# Patient Record
Sex: FEMALE | Race: WHITE | URBAN - METROPOLITAN AREA
[De-identification: names, ages, dates, MRNs, and addresses within clinical notes are randomized per-mention and may not be internally consistent; named-entity substitution may affect disease eponyms.]

---

## 2017-03-28 ENCOUNTER — OFFICE VISIT (OUTPATIENT)
Dept: FAMILY MEDICINE | Facility: CLINIC | Age: 27
End: 2017-03-28
Payer: COMMERCIAL

## 2017-03-28 VITALS
HEIGHT: 57 IN | BODY MASS INDEX: 22.44 KG/M2 | HEART RATE: 67 BPM | OXYGEN SATURATION: 100 % | SYSTOLIC BLOOD PRESSURE: 121 MMHG | TEMPERATURE: 97 F | WEIGHT: 104 LBS | DIASTOLIC BLOOD PRESSURE: 67 MMHG

## 2017-03-28 DIAGNOSIS — Z30.40 ENCOUNTER FOR SURVEILLANCE OF CONTRACEPTIVES: ICD-10-CM

## 2017-03-28 DIAGNOSIS — Z00.00 ROUTINE GENERAL MEDICAL EXAMINATION AT A HEALTH CARE FACILITY: Primary | ICD-10-CM

## 2017-03-28 PROCEDURE — 99395 PREV VISIT EST AGE 18-39: CPT | Performed by: PHYSICIAN ASSISTANT

## 2017-03-28 RX ORDER — ETONOGESTREL AND ETHINYL ESTRADIOL VAGINAL RING .015; .12 MG/D; MG/D
1 RING VAGINAL
Qty: 3 EACH | Refills: 3 | Status: SHIPPED | OUTPATIENT
Start: 2017-03-28 | End: 2017-05-18

## 2017-03-28 RX ORDER — CETIRIZINE HYDROCHLORIDE 10 MG/1
10 TABLET ORAL DAILY
COMMUNITY

## 2017-03-28 NOTE — MR AVS SNAPSHOT
After Visit Summary   3/28/2017    Aly Burrell    MRN: [id number]           Patient Information     Date Of Birth          1990        Visit Information        Provider Department      3/28/2017 4:40 PM Joceline Gaming PA-C [facility]        Today's Diagnoses     Routine general medical examination at a health care facility    -  1    Encounter for surveillance of contraceptives          Care Instructions      Preventive Health Recommendations  Female Ages 26 - 39  Yearly exam:   See your health care provider every year in order to    Review health changes.     Discuss preventive care.      Review your medicines if you your doctor has prescribed any.    Until age 30: Get a Pap test every three years (more often if you have had an abnormal result).    After age 30: Talk to your doctor about whether you should have a Pap test every 3 years or have a Pap test with HPV screening every 5 years.   You do not need a Pap test if your uterus was removed (hysterectomy) and you have not had cancer.  You should be tested each year for STDs (sexually transmitted diseases), if you're at risk.   Talk to your provider about how often to have your cholesterol checked.  If you are at risk for diabetes, you should have a diabetes test (fasting glucose).  Shots: Get a flu shot each year. Get a tetanus shot every 10 years.   Nutrition:     Eat at least 5 servings of fruits and vegetables each day.    Eat whole-grain bread, whole-wheat pasta and brown rice instead of white grains and rice.    Talk to your provider about Calcium and Vitamin D.     Lifestyle    Exercise at least 150 minutes a week (30 minutes a day, 5 days of the week). This will help you control your weight and prevent disease.    Limit alcohol to one drink per day.    No smoking.     Wear sunscreen to prevent skin cancer.    See your dentist every six months for an exam and cleaning.          Follow-ups after your  "visit        Who to contact     If you have questions or need follow up information about today's clinic visit or your schedule please contact [facility] directly at [phone].  Normal or non-critical lab and imaging results will be communicated to you by MyChart, letter or phone within 4 business days after the clinic has received the results. If you do not hear from us within 7 days, please contact the clinic through MyChart or phone. If you have a critical or abnormal lab result, we will notify you by phone as soon as possible.  Submit refill requests through Measy or call your pharmacy and they will forward the refill request to us. Please allow 3 business days for your refill to be completed.          Additional Information About Your Visit        [employer] Information     Measy lets you send messages to your doctor, view your test results, renew your prescriptions, schedule appointments and more. To sign up, go to [email or web address] . Click on \"Log in\" on the left side of the screen, which will take you to the Welcome page. Then click on \"Sign up Now\" on the right side of the page.     You will be asked to enter the access code listed below, as well as some personal information. Please follow the directions to create your username and password.     Your access code is: [email or web address]  Expires: 2017  4:59 PM     Your access code will  in 90 days. If you need help or a new code, please call your [facility] clinic or [phone].        Care EveryWhere ID     This is your Care EveryWhere ID. This could be used by other organizations to access your [facility] medical records  [id number]        Your Vitals Were     Pulse Temperature Height Last Period Pulse Oximetry Breastfeeding?    67 97  F (36.1  C) (Oral) 4' 9.24\" (1.454 m) 2017 100% No    BMI (Body Mass Index)                   22.31 kg/m2            Blood Pressure from Last 3 Encounters:   17 121/67 "   11/29/16 114/79   03/21/16 103/71    Weight from Last 3 Encounters:   03/28/17 104 lb (47.2 kg)   11/29/16 103 lb 3.2 oz (46.8 kg)   03/21/16 102 lb (46.3 kg)              Today, you had the following     No orders found for display         Today's Medication Changes          These changes are accurate as of: 3/28/17  4:59 PM.  If you have any questions, ask your nurse or doctor.               Stop taking these medicines if you haven't already. Please contact your care team if you have questions.     montelukast 10 MG tablet   Commonly known as:  SINGULAIR   Stopped by:  Joceline Gaming PA-C                Where to get your medicines      These medications were sent to [id number] - [address] [address]     Phone:  [phone]     etonogestrel-ethinyl estradiol 0.12-0.015 MG/24HR vaginal ring                Primary Care Provider Office Phone # Fax #    Joceline Gaming PA-C [phone]       [facility] [address] [facility] [address]        Thank you!     Thank you for choosing [facility]  for your care. Our goal is always to provide you with excellent care. Hearing back from our patients is one way we can continue to improve our services. Please take a few minutes to complete the written survey that you may receive in the mail after your visit with us. Thank you!             Your Updated Medication List - Protect others around you: Learn how to safely use, store and throw away your medicines at www.disposemymeds.org.          This list is accurate as of: 3/28/17  4:59 PM.  Always use your most recent med list.                   Brand Name Dispense Instructions for use    azelastine 0.05 % Soln ophthalmic solution    OPTIVAR    1 Bottle    Place 1 drop into both eyes 2 times daily       cetirizine 10 MG tablet    zyrTEC     Take 10 mg by mouth daily        etonogestrel-ethinyl estradiol 0.12-0.015 MG/24HR vaginal ring    NUVARING    3 each    Place 1 each vaginally every 21 days AS DIRECTED, REMOVE FOR 1 WEEK       fluticasone 50 MCG/ACT spray    FLONASE    1 Bottle    Spray 2 sprays into both nostrils daily       IBUPROFEN PO      EVERY 6 HOURS PRN       MULTIVITAMIN & MINERAL PO          TYLENOL PO      EVERY 4 HOURS PRN

## 2017-03-28 NOTE — NURSING NOTE
"Chief Complaint   Patient presents with     Physical       Initial /67 (BP Location: Left arm, Patient Position: Chair, Cuff Size: Adult Small)  Pulse 67  Temp 97  F (36.1  C) (Oral)  Ht 4' 9.24\" (1.454 m)  Wt 104 lb (47.2 kg)  SpO2 100%  Breastfeeding? No  BMI 22.31 kg/m2 Estimated body mass index is 22.31 kg/(m^2) as calculated from the following:    Height as of this encounter: 4' 9.24\" (1.454 m).    Weight as of this encounter: 104 lb (47.2 kg).  Medication Reconciliation: complete   Kiera Kendrick CMA       "

## 2017-03-28 NOTE — PROGRESS NOTES
SUBJECTIVE:     CC: Aly Burrell is an 26 year old woman who presents for preventive health visit.     Healthy Habits:    Do you get at least three servings of calcium containing foods daily (dairy, green leafy vegetables, etc.)? yes    Amount of exercise or daily activities, outside of work: 5 day(s) per week    Problems taking medications regularly No    Medication side effects: No    Have you had an eye exam in the past two years? yes    Do you see a dentist twice per year? yes    Do you have sleep apnea, excessive snoring or daytime drowsiness?no            Today's PHQ-2 Score:   PHQ-2 ( 1999 Pfizer) 3/28/2017 3/21/2016   Q1: Little interest or pleasure in doing things 0 0   Q2: Feeling down, depressed or hopeless 0 0   PHQ-2 Score 0 0       Abuse: Current or Past(Physical, Sexual or Emotional)- No  Do you feel safe in your environment - Yes    Social History   Substance Use Topics     Smoking status: Never Smoker     Smokeless tobacco: Never Used     Alcohol use No     The patient does not drink >3 drinks per day nor >7 drinks per week.    Recent Labs   Lab Test  11/29/12   1112   CHOL  184   HDL  79   LDL  77       Reviewed orders with patient.  Reviewed health maintenance and updated orders accordingly -     Mammo Decision Support:  Mammogram not appropriate for this patient based on age.    Pertinent mammograms are reviewed under the imaging tab.  History of abnormal Pap smear: NO - age 21-29 PAP every 3 years recommended    Reviewed and updated as needed this visit by clinical staff  Tobacco  Allergies  Meds  Med Hx  Surg Hx  Fam Hx  Soc Hx        Reviewed and updated as needed this visit by Provider  Allergies  Meds            ROS:  C: NEGATIVE for fever, chills, change in weight  I: right upper arm dry patch   E: NEGATIVE for vision changes or irritation  ENT: NEGATIVE for ear, mouth and throat problems  R: NEGATIVE for significant cough or SOB  B: NEGATIVE for masses, tenderness or  "discharge  CV: NEGATIVE for chest pain, palpitations or peripheral edema  GI: NEGATIVE for nausea, abdominal pain, heartburn, or change in bowel habits  : NEGATIVE for unusual urinary or vaginal symptoms. Periods are regular.  M: NEGATIVE for significant arthralgias or myalgia  NEURO: no new or worsening headaches   P: NEGATIVE for changes in mood or affect    Problem list, Medication list, Allergies, and Medical/Social/Surgical histories reviewed in [facility] and updated as appropriate.  OBJECTIVE:     /67 (BP Location: Left arm, Patient Position: Chair, Cuff Size: Adult Small)  Pulse 67  Temp 97  F (36.1  C) (Oral)  Ht 4' 9.24\" (1.454 m)  Wt 104 lb (47.2 kg)  LMP 03/07/2017  SpO2 100%  Breastfeeding? No  BMI 22.31 kg/m2  EXAM:  GENERAL: healthy, alert and no distress  EYES: Eyes grossly normal to inspection, PERRL and conjunctivae and sclerae normal  HENT: ear canals and TM's normal, oropharynx clear and oral mucous membranes moist  NECK: no adenopathy, no asymmetry, masses, or scars and thyroid normal to palpation  RESP: lungs clear to auscultation - no rales, rhonchi or wheezes  CV: regular rate and rhythm, normal S1 S2, no S3 or S4, no murmur, click or rub, no peripheral edema and peripheral pulses strong  ABDOMEN: soft, nontender, no hepatosplenomegaly, no masses and bowel sounds normal  MS: no gross musculoskeletal defects noted, no edema  SKIN: small dry patch on right upper arm about 1/2cm   NEURO: Normal strength and tone, mentation intact and speech normal  PSYCH: mentation appears normal, affect normal/bright    ASSESSMENT/PLAN:     1. Routine general medical examination at a health care facility  Monitor skin spot.  Looks like dry skin  - etonogestrel-ethinyl estradiol (NUVARING) 0.12-0.015 MG/24HR vaginal ring; Place 1 each vaginally every 21 days AS DIRECTED, REMOVE FOR 1 WEEK  Dispense: 3 each; Refill: 3    2. Encounter for surveillance of contraceptives    - etonogestrel-ethinyl estradiol " "(NUVARING) 0.12-0.015 MG/24HR vaginal ring; Place 1 each vaginally every 21 days AS DIRECTED, REMOVE FOR 1 WEEK  Dispense: 3 each; Refill: 3    COUNSELING:   Reviewed preventive health counseling, as reflected in patient instructions       Regular exercise       Healthy diet/nutrition       Family planning         reports that she has never smoked. She has never used smokeless tobacco.    Estimated body mass index is 22.31 kg/(m^2) as calculated from the following:    Height as of this encounter: 4' 9.24\" (1.454 m).    Weight as of this encounter: 104 lb (47.2 kg).       Counseling Resources:  ATP IV Guidelines  Pooled Cohorts Equation Calculator  Breast Cancer Risk Calculator  FRAX Risk Assessment  ICSI Preventive Guidelines  Dietary Guidelines for Americans, 2010  USDA's MyPlate  ASA Prophylaxis  Lung CA Screening    Joceline Gaming PA-C  [facility]  "

## 2017-05-18 DIAGNOSIS — Z30.40 ENCOUNTER FOR SURVEILLANCE OF CONTRACEPTIVES: ICD-10-CM

## 2017-05-18 DIAGNOSIS — Z00.00 ROUTINE GENERAL MEDICAL EXAMINATION AT A HEALTH CARE FACILITY: ICD-10-CM

## 2017-05-18 RX ORDER — ETONOGESTREL AND ETHINYL ESTRADIOL VAGINAL RING .015; .12 MG/D; MG/D
1 RING VAGINAL
Qty: 3 EACH | Refills: 3 | Status: SHIPPED | OUTPATIENT
Start: 2017-05-18 | End: 2018-04-13

## 2017-05-18 NOTE — TELEPHONE ENCOUNTER
etonogestrel-ethinyl estradiol (NUVARING) 0.12-0.015 MG/24HR vaginal ring      Last Written Prescription Date: 3-28-17  Last Fill Quantity: 3,  # refills: 3   Last Office Visit with MICHAEL, TREV or [facility] prescribing provider: 3-28-17

## 2017-07-11 ENCOUNTER — OFFICE VISIT (OUTPATIENT)
Dept: FAMILY MEDICINE | Facility: CLINIC | Age: 27
End: 2017-07-11
Payer: COMMERCIAL

## 2017-07-11 VITALS
HEART RATE: 73 BPM | WEIGHT: 103.25 LBS | DIASTOLIC BLOOD PRESSURE: 69 MMHG | BODY MASS INDEX: 22.15 KG/M2 | TEMPERATURE: 98.6 F | SYSTOLIC BLOOD PRESSURE: 101 MMHG

## 2017-07-11 DIAGNOSIS — J01.01 ACUTE RECURRENT MAXILLARY SINUSITIS: ICD-10-CM

## 2017-07-11 DIAGNOSIS — H66.001 ACUTE SUPPURATIVE OTITIS MEDIA OF RIGHT EAR WITHOUT SPONTANEOUS RUPTURE OF TYMPANIC MEMBRANE, RECURRENCE NOT SPECIFIED: Primary | ICD-10-CM

## 2017-07-11 PROCEDURE — 99213 OFFICE O/P EST LOW 20 MIN: CPT | Performed by: NURSE PRACTITIONER

## 2017-07-11 NOTE — PROGRESS NOTES
SUBJECTIVE:                                                    Aly Burrell is a 26 year old female who presents to clinic today for the following health issues:      ENT Symptoms             Symptoms: cc Present Absent Comment   Fever/Chills   x    Fatigue   x    Muscle Aches   x    Eye Irritation   x    Sneezing  x     Nasal Mickey/Drg  x     Sinus Pressure/Pain  x     Loss of smell  x     Dental pain  x     Sore Throat   x    Swollen Glands   x    Ear Pain/Fullness  x  Right ear   Cough   x    Wheeze   x    Chest Pain   x    Shortness of breath   x    Rash   x    Other         Symptom duration:  5 days   Symptom severity:  mild   Treatments tried:  sudafed   Contacts:  none     5 days ago had sinus symptoms, dizziness, right ear pain  Right maxillary sinus pain  Right ear pain  Denies fever, chills  Denies tinnitus, diminished hearing      Problem list and histories reviewed & adjusted, as indicated.  Additional history: none    Patient Active Problem List   Diagnosis     SHORT STATURE     CARDIOVASCULAR SCREENING; LDL GOAL LESS THAN 160     Non-seasonal allergic rhinitis due to animal hair and dander     Seasonal allergic rhinitis due to pollen     Allergic rhinitis due to dust mite     Allergic rhinitis due to mold     Past Surgical History:   Procedure Laterality Date     C ESOPHAGOGASTRIC FUNDOPLASTY  4/20/2009    Nissen fundoplication     LAPAROSCOPIC CHOLECYSTECTOMY  6/6/14    at [facility]       Social History   Substance Use Topics     Smoking status: Never Smoker     Smokeless tobacco: Never Used     Alcohol use No     Family History   Problem Relation Age of Onset     Allergies Mother      allergic to Sulfa, seasonal allergies     Respiratory Mother      sinus trouble-Charcoal tooth disease     HEART DISEASE Father      irreg heart beat     C.A.D. Father      mild MI at age 30     Alcohol/Drug Father      CANCER Maternal Grandmother      cervical cancer, passed in 1995 from pancreatic cancer  at age 50     Depression Maternal Uncle      C.A.D. Paternal Uncle      MI     Lipids Maternal Grandfather      Hypertension Maternal Grandfather      Prostate Cancer Maternal Grandfather      Asthma No family hx of      DIABETES No family hx of      CEREBROVASCULAR DISEASE No family hx of      Breast Cancer No family hx of      Cancer - colorectal No family hx of            Reviewed and updated as needed this visit by clinical staff       Reviewed and updated as needed this visit by Provider         ROS:  Constitutional, HEENT, cardiovascular, pulmonary, gi and gu systems are negative, except as otherwise noted.    OBJECTIVE:     /69 (BP Location: Right arm, Patient Position: Sitting, Cuff Size: Adult Small)  Pulse 73  Temp 98.6  F (37  C) (Oral)  Wt 103 lb 4 oz (46.8 kg)  LMP 06/28/2017  BMI 22.15 kg/m2  Body mass index is 22.15 kg/(m^2).  GENERAL: healthy, alert and no distress  HENT: normal cephalic/atraumatic, right ear: erythematous and bulging membrane, left ear: normal: no effusions, no erythema, normal landmarks, nose and mouth without ulcers or lesions, oropharynx clear, oral mucous membranes moist and sinuses: not tender  NECK: no adenopathy, no asymmetry, masses, or scars and thyroid normal to palpation  RESP: lungs clear to auscultation - no rales, rhonchi or wheezes  CV: regular rate and rhythm, normal S1 S2, no S3 or S4, no murmur, click or rub, no peripheral edema and peripheral pulses strong    Diagnostic Test Results:  none     ASSESSMENT/PLAN:       ICD-10-CM    1. Acute suppurative otitis media of right ear without spontaneous rupture of tympanic membrane, recurrence not specified H66.001 amoxicillin-clavulanate (AUGMENTIN) 875-125 MG per tablet   2. Acute recurrent maxillary sinusitis J01.01 amoxicillin-clavulanate (AUGMENTIN) 875-125 MG per tablet     H&P consistent with AOM. Recommend antibiotic treatment as symptoms have been present 5 days without improvement  Make take tylenol as  needed for pain  Advised may have eustachian tube dysfunction for a few weeks after resolution of infection and it does not necessitate additional treatment    ELIZA Hyatt CNP  [facility]

## 2017-07-11 NOTE — NURSING NOTE
"Chief Complaint   Patient presents with     Sinus Problem       Initial /69 (BP Location: Right arm, Patient Position: Sitting, Cuff Size: Adult Small)  Pulse 73  Temp 98.6  F (37  C) (Oral)  Wt 103 lb 4 oz (46.8 kg)  LMP 06/28/2017  BMI 22.15 kg/m2 Estimated body mass index is 22.15 kg/(m^2) as calculated from the following:    Height as of 3/28/17: 4' 9.24\" (1.454 m).    Weight as of this encounter: 103 lb 4 oz (46.8 kg).  Medication Reconciliation: complete   Mesha Morgna MA      "

## 2017-07-11 NOTE — MR AVS SNAPSHOT
"              After Visit Summary   2017    Aly Burrell    MRN: [id number]           Patient Information     Date Of Birth          1990        Visit Information        Provider Department      2017 6:00 PM Christin Courtney APRN CNP [facility]        Today's Diagnoses     Acute suppurative otitis media of right ear without spontaneous rupture of tympanic membrane, recurrence not specified    -  1    Acute recurrent maxillary sinusitis           Follow-ups after your visit        Who to contact     If you have questions or need follow up information about today's clinic visit or your schedule please contact [facility] directly at [phone].  Normal or non-critical lab and imaging results will be communicated to you by MyChart, letter or phone within 4 business days after the clinic has received the results. If you do not hear from us within 7 days, please contact the clinic through MyChart or phone. If you have a critical or abnormal lab result, we will notify you by phone as soon as possible.  Submit refill requests through Flexiant or call your pharmacy and they will forward the refill request to us. Please allow 3 business days for your refill to be completed.          Additional Information About Your Visit        MyChart Information     [employer] lets you send messages to your doctor, view your test results, renew your prescriptions, schedule appointments and more. To sign up, go to [email or web address] . Click on \"Log in\" on the left side of the screen, which will take you to the Welcome page. Then click on \"Sign up Now\" on the right side of the page.     You will be asked to enter the access code listed below, as well as some personal information. Please follow the directions to create your username and password.     Your access code is: [email or web address]  Expires: 10/9/2017  6:13 PM     Your access code will  in 90 days. If you " need help or a new code, please call your [facility] clinic or [phone].        Care EveryWhere ID     This is your Care EveryWhere ID. This could be used by other organizations to access your [facility] medical records  [id number]        Your Vitals Were     Pulse Temperature Last Period BMI (Body Mass Index)          73 98.6  F (37  C) (Oral) 06/28/2017 22.15 kg/m2         Blood Pressure from Last 3 Encounters:   07/11/17 101/69   03/28/17 121/67   11/29/16 114/79    Weight from Last 3 Encounters:   07/11/17 103 lb 4 oz (46.8 kg)   03/28/17 104 lb (47.2 kg)   11/29/16 103 lb 3.2 oz (46.8 kg)              Today, you had the following     No orders found for display         Today's Medication Changes          These changes are accurate as of: 7/11/17  6:13 PM.  If you have any questions, ask your nurse or doctor.               Start taking these medicines.        Dose/Directions    amoxicillin-clavulanate 875-125 MG per tablet   Commonly known as:  AUGMENTIN   Used for:  Acute suppurative otitis media of right ear without spontaneous rupture of tympanic membrane, recurrence not specified, Acute recurrent maxillary sinusitis   Started by:  Christin Courtney APRN CNP        Dose:  1 tablet   Take 1 tablet by mouth 2 times daily   Quantity:  20 tablet   Refills:  0            Where to get your medicines      Some of these will need a paper prescription and others can be bought over the counter.  Ask your nurse if you have questions.     Bring a paper prescription for each of these medications     amoxicillin-clavulanate 875-125 MG per tablet                Primary Care Provider Office Phone # Fax #    Joceline Gaming PA-C [phone]       [facility] [address]  [facility] [place] [address]        Equal Access to Services     CIH CARL AH: Manpreet Gipson, wapal mcgarry, qaybta kaalmamiriam moser, silvana miramontes. So [facility]  [phone].    ATENCIÓN: Si maged esquivel, tiene a whitlock disposición servicios gratuitos de asistencia lingüística. Ann liu [phone].    We comply with applicable federal civil rights laws and [place] laws. We do not discriminate on the basis of race, color, national origin, age, disability sex, sexual orientation or gender identity.            Thank you!     Thank you for choosing [facility]  for your care. Our goal is always to provide you with excellent care. Hearing back from our patients is one way we can continue to improve our services. Please take a few minutes to complete the written survey that you may receive in the mail after your visit with us. Thank you!             Your Updated Medication List - Protect others around you: Learn how to safely use, store and throw away your medicines at www.disposemymeds.org.          This list is accurate as of: 7/11/17  6:13 PM.  Always use your most recent med list.                   Brand Name Dispense Instructions for use Diagnosis    amoxicillin-clavulanate 875-125 MG per tablet    AUGMENTIN    20 tablet    Take 1 tablet by mouth 2 times daily    Acute suppurative otitis media of right ear without spontaneous rupture of tympanic membrane, recurrence not specified, Acute recurrent maxillary sinusitis       azelastine 0.05 % Soln ophthalmic solution    OPTIVAR    1 Bottle    Place 1 drop into both eyes 2 times daily    Allergic conjunctivitis, bilateral       cetirizine 10 MG tablet    zyrTEC     Take 10 mg by mouth daily        etonogestrel-ethinyl estradiol 0.12-0.015 MG/24HR vaginal ring    NUVARING    3 each    Place 1 each vaginally every 21 days AS DIRECTED, REMOVE FOR 1 WEEK    Encounter for surveillance of contraceptives, Routine general medical examination at a health care facility       fluticasone 50 MCG/ACT spray    FLONASE    1 Bottle    Spray 2 sprays into both nostrils daily    Seasonal allergic rhinitis, unspecified  allergic rhinitis trigger, Chronic allergic rhinitis       IBUPROFEN PO      EVERY 6 HOURS PRN        MULTIVITAMIN & MINERAL PO           TYLENOL PO      EVERY 4 HOURS PRN

## 2018-04-13 DIAGNOSIS — Z30.40 ENCOUNTER FOR SURVEILLANCE OF CONTRACEPTIVES: ICD-10-CM

## 2018-04-13 DIAGNOSIS — Z00.00 ROUTINE GENERAL MEDICAL EXAMINATION AT A HEALTH CARE FACILITY: ICD-10-CM

## 2018-04-16 RX ORDER — ETONOGESTREL/ETHINYL ESTRADIOL .12-.015MG
RING, VAGINAL VAGINAL
Qty: 3 EACH | Refills: 0 | Status: SHIPPED | OUTPATIENT
Start: 2018-04-16 | End: 2018-06-29

## 2018-04-16 NOTE — TELEPHONE ENCOUNTER
Prescription approved per [facility].    Rhoda Alicia RN  [facility]

## 2018-06-29 ENCOUNTER — TELEPHONE (OUTPATIENT)
Dept: FAMILY MEDICINE | Facility: CLINIC | Age: 28
End: 2018-06-29

## 2018-06-29 DIAGNOSIS — Z00.00 ROUTINE GENERAL MEDICAL EXAMINATION AT A HEALTH CARE FACILITY: ICD-10-CM

## 2018-07-03 RX ORDER — ETONOGESTREL AND ETHINYL ESTRADIOL VAGINAL RING .015; .12 MG/D; MG/D
1 RING VAGINAL
Qty: 1 EACH | Refills: 0 | Status: SHIPPED | OUTPATIENT
Start: 2018-07-03 | End: 2018-07-06

## 2018-07-05 NOTE — TELEPHONE ENCOUNTER
Faxed message from pharmacy:  Please clarify the directions for the prescriptions for Nuvaring Vag Ring.  Typical directions are: insert 1 ring vaginally, leave in for 21 days, remover for 1 week, then repeat with new ring.

## 2018-07-05 NOTE — TELEPHONE ENCOUNTER
Called [facility] pharmacist ref# [id number] the directions for this medication changed to use every 30 days???  etonogestrel-ethinyl estradiol (NUVARING) 0.12-0.015 MG/24HR vaginal ring 1 each 0 7/3/2018  No      Sig - Route: Place 1 each vaginally every 30 days Due for physical - Vaginal     Class: E-Prescribe     Order: [id number]     E-Prescribing Status: Receipt confirmed by pharmacy (7/3/2018 10:53 AM CDT)         Nurse does not see documentation for change in directions.    Summer Morris RN  [facility]

## 2018-07-06 RX ORDER — ETONOGESTREL AND ETHINYL ESTRADIOL VAGINAL RING .015; .12 MG/D; MG/D
RING VAGINAL
Qty: 1 EACH | Refills: 0 | Status: SHIPPED | OUTPATIENT
Start: 2018-07-06

## 2020-03-30 NOTE — TELEPHONE ENCOUNTER
"Requested Prescriptions   Pending Prescriptions Disp Refills     NUVARING 0.12-0.015 MG/24HR vaginal ring [Pharmacy Med Name: NUVARING RING  VAGINAL RING]      Last Written Prescription Date:  4/16/18  Last Fill Quantity: 3,  # refills: 0   Last office visit: 7/11/2017 with prescribing provider:     Future Office Visit:     Sig: INSERT 1 RING VAGINALLY  EVERY 21 DAYS AS DIRECTED  REMOVE FOR 1 WEEK    Contraceptives Protocol Passed    6/29/2018  9:02 PM       Passed - Patient is not a current smoker if age is 35 or older       Passed - Recent (12 mo) or future (30 days) visit within the authorizing provider's specialty    Patient had office visit in the last 12 months or has a visit in the next 30 days with authorizing provider or within the authorizing provider's specialty.  See \"Patient Info\" tab in inbasket, or \"Choose Columns\" in Meds & Orders section of the refill encounter.           Passed - No active pregnancy on record       Passed - No positive pregnancy test in past 12 months          " 165.1

## 2021-10-19 NOTE — TELEPHONE ENCOUNTER
"Requested Prescriptions   Pending Prescriptions Disp Refills     NUVARING 0.12-0.015 MG/24HR vaginal ring [Pharmacy Med Name: NUVARING RING  VAGINAL RING]      Last Written Prescription Date:  5/18/17  Last Fill Quantity: 3,  # refills: 3   Last office visit: 7/11/2017 with prescribing provider:     Future Office Visit:     Sig: INSERT 1 RING VAGINALLY  EVERY 21 DAYS AS DIRECTED  REMOVE FOR 1 WEEK    Contraceptives Protocol Passed    4/13/2018  8:38 PM       Passed - Patient is not a current smoker if age is 35 or older       Passed - Recent (12 mo) or future (30 days) visit within the authorizing provider's specialty    Patient had office visit in the last 12 months or has a visit in the next 30 days with authorizing provider or within the authorizing provider's specialty.  See \"Patient Info\" tab in inbasket, or \"Choose Columns\" in Meds & Orders section of the refill encounter.           Passed - No active pregnancy on record       Passed - No positive pregnancy test in past 12 months          " [Negative] : Heme/Lymph